# Patient Record
Sex: FEMALE | Race: WHITE | NOT HISPANIC OR LATINO | Employment: PART TIME | URBAN - METROPOLITAN AREA
[De-identification: names, ages, dates, MRNs, and addresses within clinical notes are randomized per-mention and may not be internally consistent; named-entity substitution may affect disease eponyms.]

---

## 2023-08-18 ENCOUNTER — HOSPITAL ENCOUNTER (OUTPATIENT)
Dept: DATA CONVERSION | Facility: HOSPITAL | Age: 53
Discharge: HOME | End: 2023-08-18
Payer: COMMERCIAL

## 2023-08-18 DIAGNOSIS — Z12.31 ENCOUNTER FOR SCREENING MAMMOGRAM FOR MALIGNANT NEOPLASM OF BREAST: ICD-10-CM

## 2023-08-19 ENCOUNTER — HOSPITAL ENCOUNTER (OUTPATIENT)
Dept: DATA CONVERSION | Facility: HOSPITAL | Age: 53
Discharge: HOME | End: 2023-08-19
Payer: COMMERCIAL

## 2023-08-19 DIAGNOSIS — Z13.6 ENCOUNTER FOR SCREENING FOR CARDIOVASCULAR DISORDERS: ICD-10-CM

## 2023-09-12 PROBLEM — M54.50 LOW BACK PAIN: Status: ACTIVE | Noted: 2023-09-12

## 2023-09-12 PROBLEM — E78.00 PURE HYPERCHOLESTEROLEMIA: Status: ACTIVE | Noted: 2023-09-12

## 2023-09-12 PROBLEM — K64.5 THROMBOSED EXTERNAL HEMORRHOIDS: Status: ACTIVE | Noted: 2023-09-12

## 2023-09-12 PROBLEM — R53.1 ASTHENIA: Status: ACTIVE | Noted: 2023-09-12

## 2023-09-12 PROBLEM — R06.00 DYSPNEA: Status: ACTIVE | Noted: 2023-09-12

## 2023-09-12 PROBLEM — M51.379 DEGENERATION OF LUMBOSACRAL INTERVERTEBRAL DISC: Status: ACTIVE | Noted: 2023-09-12

## 2023-09-12 PROBLEM — M51.37 DEGENERATION OF LUMBOSACRAL INTERVERTEBRAL DISC: Status: ACTIVE | Noted: 2023-09-12

## 2023-09-12 PROBLEM — G47.00 INSOMNIA: Status: ACTIVE | Noted: 2023-09-12

## 2023-09-12 PROBLEM — M06.9 RHEUMATOID ARTHRITIS (MULTI): Status: ACTIVE | Noted: 2023-09-12

## 2023-09-12 PROBLEM — G45.9 TRANSIENT ISCHEMIC ATTACK: Status: ACTIVE | Noted: 2023-09-12

## 2023-09-12 PROBLEM — K81.9 CHOLECYSTITIS: Status: ACTIVE | Noted: 2023-09-12

## 2023-09-12 PROBLEM — R33.9 URINARY RETENTION: Status: ACTIVE | Noted: 2023-09-12

## 2023-09-12 PROBLEM — R35.0 FREQUENCY OF URINATION: Status: ACTIVE | Noted: 2023-09-12

## 2023-09-12 PROBLEM — R10.9 FLANK PAIN: Status: ACTIVE | Noted: 2023-09-12

## 2023-09-12 PROBLEM — K59.01 SLOW TRANSIT CONSTIPATION: Status: ACTIVE | Noted: 2023-09-12

## 2023-09-12 PROBLEM — F41.9 ANXIETY: Status: ACTIVE | Noted: 2023-09-12

## 2023-09-12 PROBLEM — G40.909 SEIZURE DISORDER (MULTI): Status: ACTIVE | Noted: 2023-09-12

## 2023-09-12 RX ORDER — LAMOTRIGINE 150 MG/1
TABLET ORAL
COMMUNITY

## 2023-09-12 RX ORDER — CYANOCOBALAMIN (VITAMIN B-12) 3000MCG/ML
DROPS SUBLINGUAL
COMMUNITY

## 2023-09-12 RX ORDER — PRAVASTATIN SODIUM 10 MG/1
TABLET ORAL
COMMUNITY
Start: 2021-09-27 | End: 2023-11-01

## 2023-09-12 RX ORDER — FLUTICASONE PROPIONATE 50 MCG
SPRAY, SUSPENSION (ML) NASAL
COMMUNITY
Start: 2022-07-26

## 2023-09-15 ENCOUNTER — HOSPITAL ENCOUNTER (OUTPATIENT)
Dept: DATA CONVERSION | Facility: HOSPITAL | Age: 53
Discharge: HOME | End: 2023-09-15
Payer: COMMERCIAL

## 2023-09-15 DIAGNOSIS — M79.641 PAIN IN RIGHT HAND: ICD-10-CM

## 2023-11-01 DIAGNOSIS — E78.49 ESSENTIAL FAMILIAL HYPERLIPIDEMIA: Primary | ICD-10-CM

## 2023-11-01 RX ORDER — PRAVASTATIN SODIUM 10 MG/1
10 TABLET ORAL DAILY
Qty: 90 TABLET | Refills: 1 | Status: SHIPPED | OUTPATIENT
Start: 2023-11-01 | End: 2023-12-22 | Stop reason: SDUPTHER

## 2023-12-22 ENCOUNTER — TELEMEDICINE (OUTPATIENT)
Dept: PRIMARY CARE | Facility: CLINIC | Age: 53
End: 2023-12-22
Payer: COMMERCIAL

## 2023-12-22 DIAGNOSIS — E78.49 ESSENTIAL FAMILIAL HYPERLIPIDEMIA: ICD-10-CM

## 2023-12-22 DIAGNOSIS — J11.1 INFLUENZA: Primary | ICD-10-CM

## 2023-12-22 PROCEDURE — 99212 OFFICE O/P EST SF 10 MIN: CPT | Performed by: REGISTERED NURSE

## 2023-12-22 RX ORDER — PRAVASTATIN SODIUM 10 MG/1
10 TABLET ORAL DAILY
Qty: 90 TABLET | Refills: 3 | Status: SHIPPED | OUTPATIENT
Start: 2023-12-22 | End: 2024-06-19

## 2023-12-22 RX ORDER — VALACYCLOVIR HYDROCHLORIDE 500 MG/1
500 TABLET, FILM COATED ORAL DAILY
COMMUNITY

## 2023-12-22 ASSESSMENT — ENCOUNTER SYMPTOMS
DEPRESSION: 0
OCCASIONAL FEELINGS OF UNSTEADINESS: 0
LOSS OF SENSATION IN FEET: 0

## 2023-12-22 NOTE — PROGRESS NOTES
Subjective   Patient ID: Margie Romero is a 53 y.o. female who presents for Generalized Body Aches (Pt not feeling well. No specifics).    HPI     Review of Systems    Objective   There were no vitals taken for this visit.    Physical Exam    Assessment/Plan

## 2024-01-09 ENCOUNTER — OFFICE VISIT (OUTPATIENT)
Dept: PRIMARY CARE | Facility: CLINIC | Age: 54
End: 2024-01-09
Payer: COMMERCIAL

## 2024-01-09 VITALS
SYSTOLIC BLOOD PRESSURE: 126 MMHG | OXYGEN SATURATION: 98 % | HEART RATE: 79 BPM | WEIGHT: 208.8 LBS | BODY MASS INDEX: 35.65 KG/M2 | RESPIRATION RATE: 18 BRPM | TEMPERATURE: 98.2 F | HEIGHT: 64 IN | DIASTOLIC BLOOD PRESSURE: 82 MMHG

## 2024-01-09 DIAGNOSIS — G40.909 SEIZURE DISORDER (MULTI): ICD-10-CM

## 2024-01-09 DIAGNOSIS — G40.209 COMPLEX PARTIAL SEIZURES EVOLVING TO GENERALIZED TONIC-CLONIC SEIZURES (MULTI): ICD-10-CM

## 2024-01-09 DIAGNOSIS — M51.37 DEGENERATION OF LUMBOSACRAL INTERVERTEBRAL DISC: ICD-10-CM

## 2024-01-09 DIAGNOSIS — Z00.00 ROUTINE GENERAL MEDICAL EXAMINATION AT A HEALTH CARE FACILITY: Primary | ICD-10-CM

## 2024-01-09 DIAGNOSIS — R35.0 FREQUENCY OF URINATION: ICD-10-CM

## 2024-01-09 DIAGNOSIS — E66.9 OBESITY, CLASS II, BMI 35-39.9: ICD-10-CM

## 2024-01-09 DIAGNOSIS — M54.50 LOW BACK PAIN, UNSPECIFIED BACK PAIN LATERALITY, UNSPECIFIED CHRONICITY, UNSPECIFIED WHETHER SCIATICA PRESENT: ICD-10-CM

## 2024-01-09 DIAGNOSIS — G47.00 INSOMNIA, UNSPECIFIED TYPE: ICD-10-CM

## 2024-01-09 DIAGNOSIS — F41.9 ANXIETY: ICD-10-CM

## 2024-01-09 DIAGNOSIS — M06.9 RHEUMATOID ARTHRITIS, INVOLVING UNSPECIFIED SITE, UNSPECIFIED WHETHER RHEUMATOID FACTOR PRESENT (MULTI): ICD-10-CM

## 2024-01-09 DIAGNOSIS — M25.50 ARTHRALGIA, UNSPECIFIED JOINT: ICD-10-CM

## 2024-01-09 PROBLEM — K81.9 CHOLECYSTITIS: Status: RESOLVED | Noted: 2023-09-12 | Resolved: 2024-01-09

## 2024-01-09 PROBLEM — Z86.73 HX OF TRANSIENT ISCHEMIC ATTACK (TIA): Status: ACTIVE | Noted: 2024-01-09

## 2024-01-09 PROBLEM — G45.9 TRANSIENT ISCHEMIC ATTACK: Status: RESOLVED | Noted: 2023-09-12 | Resolved: 2024-01-09

## 2024-01-09 PROBLEM — R10.9 FLANK PAIN: Status: RESOLVED | Noted: 2023-09-12 | Resolved: 2024-01-09

## 2024-01-09 PROBLEM — E78.00 PURE HYPERCHOLESTEROLEMIA: Status: RESOLVED | Noted: 2023-09-12 | Resolved: 2024-01-09

## 2024-01-09 LAB
POC APPEARANCE, URINE: CLEAR
POC BILIRUBIN, URINE: NEGATIVE
POC BLOOD, URINE: NEGATIVE
POC COLOR, URINE: YELLOW
POC GLUCOSE, URINE: NEGATIVE MG/DL
POC KETONES, URINE: NEGATIVE MG/DL
POC LEUKOCYTES, URINE: NEGATIVE
POC NITRITE,URINE: NEGATIVE
POC PH, URINE: 6 PH
POC PROTEIN, URINE: NEGATIVE MG/DL
POC SPECIFIC GRAVITY, URINE: 1.01
POC UROBILINOGEN, URINE: 0.2 EU/DL

## 2024-01-09 PROCEDURE — 36415 COLL VENOUS BLD VENIPUNCTURE: CPT | Performed by: REGISTERED NURSE

## 2024-01-09 PROCEDURE — 84443 ASSAY THYROID STIM HORMONE: CPT | Performed by: REGISTERED NURSE

## 2024-01-09 PROCEDURE — 86140 C-REACTIVE PROTEIN: CPT | Performed by: REGISTERED NURSE

## 2024-01-09 PROCEDURE — 85025 COMPLETE CBC W/AUTO DIFF WBC: CPT | Mod: WESLAB | Performed by: REGISTERED NURSE

## 2024-01-09 PROCEDURE — 81002 URINALYSIS NONAUTO W/O SCOPE: CPT | Performed by: REGISTERED NURSE

## 2024-01-09 PROCEDURE — 85652 RBC SED RATE AUTOMATED: CPT | Performed by: REGISTERED NURSE

## 2024-01-09 PROCEDURE — 82306 VITAMIN D 25 HYDROXY: CPT | Performed by: REGISTERED NURSE

## 2024-01-09 PROCEDURE — 82607 VITAMIN B-12: CPT | Performed by: REGISTERED NURSE

## 2024-01-09 PROCEDURE — 99396 PREV VISIT EST AGE 40-64: CPT | Performed by: REGISTERED NURSE

## 2024-01-09 PROCEDURE — 1036F TOBACCO NON-USER: CPT | Performed by: REGISTERED NURSE

## 2024-01-09 PROCEDURE — 80053 COMPREHEN METABOLIC PANEL: CPT | Performed by: REGISTERED NURSE

## 2024-01-09 RX ORDER — LEFLUNOMIDE 20 MG/1
20 TABLET ORAL DAILY
COMMUNITY

## 2024-01-09 RX ORDER — HYDROGEN PEROXIDE 3 %
20 SOLUTION, NON-ORAL MISCELLANEOUS AS NEEDED
COMMUNITY

## 2024-01-09 RX ORDER — ONDANSETRON 4 MG/1
4 TABLET, FILM COATED ORAL EVERY 8 HOURS PRN
COMMUNITY

## 2024-01-09 RX ORDER — NORETHINDRONE 5 MG/1
5 TABLET ORAL EVERY 24 HOURS
COMMUNITY
Start: 2021-09-01

## 2024-01-09 RX ORDER — AMITRIPTYLINE HYDROCHLORIDE 10 MG/1
10 TABLET, FILM COATED ORAL NIGHTLY
COMMUNITY
Start: 2023-12-28

## 2024-01-09 RX ORDER — IBUPROFEN AND FAMOTIDINE 26.6; 8 MG/1; MG/1
TABLET ORAL
COMMUNITY

## 2024-01-09 RX ORDER — DOCUSATE SODIUM 100 MG/1
100 CAPSULE, LIQUID FILLED ORAL 2 TIMES DAILY PRN
COMMUNITY

## 2024-01-09 RX ORDER — NITROFURANTOIN 25; 75 MG/1; MG/1
100 CAPSULE ORAL EVERY 12 HOURS
COMMUNITY
Start: 2021-09-01

## 2024-01-09 ASSESSMENT — PAIN SCALES - GENERAL: PAINLEVEL: 0-NO PAIN

## 2024-01-09 ASSESSMENT — COLUMBIA-SUICIDE SEVERITY RATING SCALE - C-SSRS
1. IN THE PAST MONTH, HAVE YOU WISHED YOU WERE DEAD OR WISHED YOU COULD GO TO SLEEP AND NOT WAKE UP?: NO
2. HAVE YOU ACTUALLY HAD ANY THOUGHTS OF KILLING YOURSELF?: NO
6. HAVE YOU EVER DONE ANYTHING, STARTED TO DO ANYTHING, OR PREPARED TO DO ANYTHING TO END YOUR LIFE?: NO

## 2024-01-09 ASSESSMENT — ENCOUNTER SYMPTOMS
LOSS OF SENSATION IN FEET: 0
DEPRESSION: 0
OCCASIONAL FEELINGS OF UNSTEADINESS: 0

## 2024-01-09 ASSESSMENT — PATIENT HEALTH QUESTIONNAIRE - PHQ9
SUM OF ALL RESPONSES TO PHQ9 QUESTIONS 1 AND 2: 0
2. FEELING DOWN, DEPRESSED OR HOPELESS: NOT AT ALL
1. LITTLE INTEREST OR PLEASURE IN DOING THINGS: NOT AT ALL

## 2024-01-09 NOTE — PROGRESS NOTES
"Subjective   Patient ID: Margie Romero is a 53 y.o. female who presents for Annual Exam and UTI.    Pt here for PE and BW       Review of Systems   All other systems reviewed and are negative.      Objective   /82   Pulse 79   Temp 36.8 °C (98.2 °F)   Resp 18   Ht 1.626 m (5' 4\")   Wt 94.7 kg (208 lb 12.8 oz)   SpO2 98%   BMI 35.84 kg/m²     Physical Exam  Vitals reviewed.   Constitutional:       General: She is not in acute distress.     Appearance: Normal appearance.   HENT:      Right Ear: Tympanic membrane, ear canal and external ear normal. There is no impacted cerumen.      Left Ear: Tympanic membrane, ear canal and external ear normal. There is no impacted cerumen.      Nose: Nose normal. No congestion or rhinorrhea.      Mouth/Throat:      Mouth: Mucous membranes are moist.      Pharynx: No oropharyngeal exudate or posterior oropharyngeal erythema.   Eyes:      General:         Right eye: No discharge.         Left eye: No discharge.      Extraocular Movements: Extraocular movements intact.      Conjunctiva/sclera: Conjunctivae normal.      Pupils: Pupils are equal, round, and reactive to light.   Cardiovascular:      Rate and Rhythm: Normal rate and regular rhythm.      Pulses: Normal pulses.      Heart sounds: No murmur heard.  Pulmonary:      Effort: Pulmonary effort is normal. No respiratory distress.      Breath sounds: Normal breath sounds. No wheezing or rhonchi.   Chest:      Chest wall: No tenderness.   Abdominal:      General: Abdomen is flat. Bowel sounds are normal. There is no distension.      Palpations: Abdomen is soft.      Tenderness: There is no abdominal tenderness. There is no right CVA tenderness, guarding or rebound.   Musculoskeletal:         General: Normal range of motion.      Cervical back: Normal range of motion and neck supple.      Right lower leg: No edema.      Left lower leg: No edema.   Skin:     General: Skin is warm and dry.      Capillary Refill: Capillary " refill takes 2 to 3 seconds.   Neurological:      General: No focal deficit present.      Mental Status: She is alert and oriented to person, place, and time.   Psychiatric:         Mood and Affect: Mood normal.         Behavior: Behavior normal.         Thought Content: Thought content normal.       Assessment/Plan   Problem List Items Addressed This Visit             ICD-10-CM    Anxiety F41.9    Degeneration of lumbosacral intervertebral disc M51.37    Frequency of urination R35.0    Relevant Orders    POCT UA (nonautomated) manually resulted (Completed)    Insomnia G47.00    Low back pain M54.50    Rheumatoid arthritis (CMS/HCC) M06.9    Seizure disorder (CMS/HCC) G40.909    Complex partial seizures evolving to generalized tonic-clonic seizures (CMS/HCC) G40.209    Hx of transient ischemic attack (TIA) Z86.73     Other Visit Diagnoses         Codes    Routine general medical examination at a health care facility    -  Primary Z00.00    Obesity, Class II, BMI 35-39.9     E66.9

## 2024-01-10 LAB
25(OH)D3 SERPL-MCNC: 45 NG/ML (ref 31–100)
ALBUMIN SERPL-MCNC: 4.4 G/DL (ref 3.5–5)
ALP BLD-CCNC: 93 U/L (ref 35–125)
ALT SERPL-CCNC: 18 U/L (ref 5–40)
ANION GAP SERPL CALC-SCNC: 17 MMOL/L
AST SERPL-CCNC: 31 U/L (ref 5–40)
BASOPHILS # BLD AUTO: 0.05 X10*3/UL (ref 0–0.1)
BASOPHILS NFR BLD AUTO: 0.6 %
BILIRUB SERPL-MCNC: 0.3 MG/DL (ref 0.1–1.2)
BUN SERPL-MCNC: 13 MG/DL (ref 8–25)
CALCIUM SERPL-MCNC: 9.4 MG/DL (ref 8.5–10.4)
CHLORIDE SERPL-SCNC: 101 MMOL/L (ref 97–107)
CO2 SERPL-SCNC: 22 MMOL/L (ref 24–31)
CREAT SERPL-MCNC: 0.8 MG/DL (ref 0.4–1.6)
CRP SERPL-MCNC: <0.3 MG/DL (ref 0–2)
EGFRCR SERPLBLD CKD-EPI 2021: 89 ML/MIN/1.73M*2
EOSINOPHIL # BLD AUTO: 0.28 X10*3/UL (ref 0–0.7)
EOSINOPHIL NFR BLD AUTO: 3.5 %
ERYTHROCYTE [DISTWIDTH] IN BLOOD BY AUTOMATED COUNT: 12.5 % (ref 11.5–14.5)
ERYTHROCYTE [SEDIMENTATION RATE] IN BLOOD BY WESTERGREN METHOD: 14 MM/H (ref 0–30)
GLUCOSE SERPL-MCNC: 106 MG/DL (ref 65–99)
HCT VFR BLD AUTO: 43.7 % (ref 36–46)
HGB BLD-MCNC: 14.4 G/DL (ref 12–16)
IMM GRANULOCYTES # BLD AUTO: 0.02 X10*3/UL (ref 0–0.7)
IMM GRANULOCYTES NFR BLD AUTO: 0.2 % (ref 0–0.9)
LYMPHOCYTES # BLD AUTO: 2.74 X10*3/UL (ref 1.2–4.8)
LYMPHOCYTES NFR BLD AUTO: 34.2 %
MCH RBC QN AUTO: 30.3 PG (ref 26–34)
MCHC RBC AUTO-ENTMCNC: 33 G/DL (ref 32–36)
MCV RBC AUTO: 92 FL (ref 80–100)
MONOCYTES # BLD AUTO: 0.62 X10*3/UL (ref 0.1–1)
MONOCYTES NFR BLD AUTO: 7.7 %
NEUTROPHILS # BLD AUTO: 4.31 X10*3/UL (ref 1.2–7.7)
NEUTROPHILS NFR BLD AUTO: 53.8 %
NRBC BLD-RTO: 0 /100 WBCS (ref 0–0)
PLATELET # BLD AUTO: 260 X10*3/UL (ref 150–450)
POTASSIUM SERPL-SCNC: 4.4 MMOL/L (ref 3.4–5.1)
PROT SERPL-MCNC: 6.7 G/DL (ref 5.9–7.9)
RBC # BLD AUTO: 4.75 X10*6/UL (ref 4–5.2)
SODIUM SERPL-SCNC: 140 MMOL/L (ref 133–145)
TSH SERPL DL<=0.05 MIU/L-ACNC: 2.35 MIU/L (ref 0.27–4.2)
VIT B12 SERPL-MCNC: 579 PG/ML (ref 211–946)
WBC # BLD AUTO: 8 X10*3/UL (ref 4.4–11.3)

## 2024-11-22 ENCOUNTER — OFFICE VISIT (OUTPATIENT)
Dept: PRIMARY CARE | Facility: CLINIC | Age: 54
End: 2024-11-22
Payer: COMMERCIAL

## 2024-11-22 DIAGNOSIS — M54.50 LOW BACK PAIN, UNSPECIFIED BACK PAIN LATERALITY, UNSPECIFIED CHRONICITY, UNSPECIFIED WHETHER SCIATICA PRESENT: Primary | ICD-10-CM

## 2024-11-22 PROCEDURE — 99213 OFFICE O/P EST LOW 20 MIN: CPT | Performed by: NURSE PRACTITIONER

## 2024-11-22 RX ORDER — METHYLPREDNISOLONE 4 MG/1
TABLET ORAL
Qty: 21 TABLET | Refills: 0 | Status: SHIPPED | OUTPATIENT
Start: 2024-11-22

## 2024-11-22 RX ORDER — CYCLOBENZAPRINE HCL 10 MG
10 TABLET ORAL NIGHTLY PRN
Qty: 30 TABLET | Refills: 0 | Status: SHIPPED | OUTPATIENT
Start: 2024-11-22 | End: 2025-01-21

## 2024-11-22 ASSESSMENT — ENCOUNTER SYMPTOMS
CONSTITUTIONAL NEGATIVE: 1
CARDIOVASCULAR NEGATIVE: 1
GASTROINTESTINAL NEGATIVE: 1
NEUROLOGICAL NEGATIVE: 1
DEPRESSION: 0
RESPIRATORY NEGATIVE: 1
MUSCULOSKELETAL NEGATIVE: 1
OCCASIONAL FEELINGS OF UNSTEADINESS: 0
LOSS OF SENSATION IN FEET: 0

## 2024-11-22 ASSESSMENT — PATIENT HEALTH QUESTIONNAIRE - PHQ9
2. FEELING DOWN, DEPRESSED OR HOPELESS: NOT AT ALL
1. LITTLE INTEREST OR PLEASURE IN DOING THINGS: NOT AT ALL
SUM OF ALL RESPONSES TO PHQ9 QUESTIONS 1 AND 2: 0

## 2024-11-22 ASSESSMENT — PAIN SCALES - GENERAL: PAINLEVEL_OUTOF10: 6

## 2024-11-22 NOTE — PROGRESS NOTES
Subjective   Patient ID: Margie Romero is a 54 y.o. female who presents for Weight Loss, Steroid, and RA.  Was seen by rheumatology and stopped Orencia issues with Dr Soto was taking home injections but not the same as Orencia will be seeing Dr Barksdale office March  Discuss tizeipitide injection or pills from online pharmacy   Issues with   HPI     Review of Systems   Constitutional: Negative.    HENT: Negative.     Respiratory: Negative.     Cardiovascular: Negative.    Gastrointestinal: Negative.    Genitourinary: Negative.    Musculoskeletal: Negative.    Neurological: Negative.        Objective   There were no vitals taken for this visit.    Physical Exam  Constitutional:       General: She is not in acute distress.     Appearance: Normal appearance.   Cardiovascular:      Rate and Rhythm: Normal rate and regular rhythm.      Heart sounds: No murmur heard.  Pulmonary:      Effort: Pulmonary effort is normal.      Breath sounds: Normal breath sounds. No wheezing.   Musculoskeletal:      Cervical back: No edema, erythema or tenderness. Normal range of motion.      Thoracic back: No spasms, tenderness or bony tenderness. Normal range of motion.      Lumbar back: No spasms, tenderness or bony tenderness. Normal range of motion. Negative right straight leg raise test and negative left straight leg raise test.      Comments:      Skin:     General: Skin is warm and dry.   Neurological:      Mental Status: She is alert.      Cranial Nerves: Cranial nerves 2-12 are intact.      Sensory: Sensation is intact.      Motor: No weakness, tremor, atrophy or abnormal muscle tone.      Gait: Gait is intact. Gait normal.      Deep Tendon Reflexes:      Reflex Scores:       Brachioradialis reflexes are 2+ on the right side and 2+ on the left side.       Patellar reflexes are 2+ on the right side and 2+ on the left side.       Achilles reflexes are 2+ on the right side and 2+ on the left side.        Assessment/Plan   Problem List  Items Addressed This Visit             ICD-10-CM    Low back pain - Primary M54.50    Relevant Medications    methylPREDNISolone (Medrol Dospak) 4 mg tablets    cyclobenzaprine (Flexeril) 10 mg tablet

## 2025-03-03 ENCOUNTER — OFFICE VISIT (OUTPATIENT)
Dept: RHEUMATOLOGY | Facility: CLINIC | Age: 55
End: 2025-03-03
Payer: COMMERCIAL

## 2025-03-03 ENCOUNTER — HOSPITAL ENCOUNTER (OUTPATIENT)
Dept: RADIOLOGY | Facility: HOSPITAL | Age: 55
Discharge: HOME | End: 2025-03-03
Payer: COMMERCIAL

## 2025-03-03 VITALS
OXYGEN SATURATION: 97 % | WEIGHT: 218.2 LBS | SYSTOLIC BLOOD PRESSURE: 126 MMHG | BODY MASS INDEX: 37.45 KG/M2 | HEART RATE: 62 BPM | DIASTOLIC BLOOD PRESSURE: 82 MMHG

## 2025-03-03 DIAGNOSIS — Z87.39 HISTORY OF RHEUMATOID ARTHRITIS: ICD-10-CM

## 2025-03-03 DIAGNOSIS — M25.50 POLYARTHRALGIA: Primary | ICD-10-CM

## 2025-03-03 DIAGNOSIS — M25.50 POLYARTHRALGIA: ICD-10-CM

## 2025-03-03 PROCEDURE — 73630 X-RAY EXAM OF FOOT: CPT | Mod: 50

## 2025-03-03 PROCEDURE — 72202 X-RAY EXAM SI JOINTS 3/> VWS: CPT

## 2025-03-03 PROCEDURE — 99215 OFFICE O/P EST HI 40 MIN: CPT | Performed by: INTERNAL MEDICINE

## 2025-03-03 PROCEDURE — 73080 X-RAY EXAM OF ELBOW: CPT | Mod: 50

## 2025-03-03 PROCEDURE — 1036F TOBACCO NON-USER: CPT | Performed by: INTERNAL MEDICINE

## 2025-03-03 PROCEDURE — 73521 X-RAY EXAM HIPS BI 2 VIEWS: CPT

## 2025-03-03 PROCEDURE — 73130 X-RAY EXAM OF HAND: CPT | Mod: 50

## 2025-03-03 PROCEDURE — 99205 OFFICE O/P NEW HI 60 MIN: CPT | Performed by: INTERNAL MEDICINE

## 2025-03-03 ASSESSMENT — PATIENT HEALTH QUESTIONNAIRE - PHQ9
2. FEELING DOWN, DEPRESSED OR HOPELESS: NOT AT ALL
SUM OF ALL RESPONSES TO PHQ9 QUESTIONS 1 AND 2: 0
1. LITTLE INTEREST OR PLEASURE IN DOING THINGS: NOT AT ALL

## 2025-03-03 ASSESSMENT — RHEUMATOLOGY NEW PATIENT QUESTIONNAIRE
DEPRESSION: N
UNUSUAL BLEEDING: N
ABNORMAL URINE: N
MORNING STIFFNESS IN LOWER BACK: Y
EXCESSIVE HAIR LOSS (MORE THAN YOUR NORM): N
INCREASED SUSCEPTIBILITY TO INFECTION: N
MORNING STIFFNESS: Y
RASH: N
NIGHT SWEATS: N
CHEST PAIN: N
HOW WOULD YOU DESCRIBE YOUR STIFFNESS ON AVERAGE: MILD
UNUSUAL FATIGUE: N
UNUSUALLY RAPID OR SLOWED HEART RATE: N
NUMBNESS OR TINGLING IN HANDS OR FEET: N
NAUSEA: N
ANEMIA: N
LOSS OF CONSCIOUSNESS: N
HEARTBURN OR REFLUX: N
SKIN REDNESS: N
SWOLLEN OR TENDER GLANDS: N
PAIN OR BURNING ON URINATION: N
SUN SENSITIVE (SUN ALLERGY): N
BEHAVIORAL CHANGES: N
DIFFICULTY SWALLOWING: N
SKIN TIGHTNESS: N
RASH OR ULCERS: N
MUSCLE WEAKNESS: Y
DOUBLE OR BLURRED VISION: N
SORES IN MOUTH OR NOSE: N
VOMITING OF BLOOD OR COFFEE GROUND CONSISTENCY MATERIAL: N
DRYNESS OF MOUTH: N
UNEXPLAINED WEIGHT CHANGE: N
EYE REDNESS: N
VAGINAL DRYNESS: N
DIFFICULTY BREATHING LYING DOWN: N
BLACK STOOLS: N
BLOOD IN STOOLS: N
SEIZURES: Y
FEVER: N
HEADACHES: N
FAINTING: N
UNEXPLAINED HEARING LOSS: N
MEMORY LOSS: Y
DIFFICULTY FALLING ASLEEP: N
EASILY LOSING TEMPER: N
JOINT PAIN: Y
EYE PAIN: N
JOINT SWELLING: Y
SWOLLEN LEGS OR FEET: N
PERSISTENT DIARRHEA: N
EYE DRYNESS: N
SHORTNESS OF BREATH: N
HOARSE VOICE: N
NODULES/BUMPS: N
COUGH: N
LOSS OF VISION: N
EASY BRUISING: Y
ANXIETY: Y
DIFFICULTY STAYING ASLEEP: Y
COLOR CHANGES OF HANDS OR FEET IN THE COLD: N
JAUNDICE: N
AGITATION: N
STOMACH PAIN: N

## 2025-03-03 ASSESSMENT — PAIN SCALES - GENERAL: PAINLEVEL_OUTOF10: 4

## 2025-03-03 ASSESSMENT — ENCOUNTER SYMPTOMS
OCCASIONAL FEELINGS OF UNSTEADINESS: 1
LOSS OF SENSATION IN FEET: 0
DEPRESSION: 0

## 2025-03-03 NOTE — PROGRESS NOTES
Subjective   Patient ID: [id number]   Margie Romero is a 54 y.o. female who presents for New Patient Visit.    HPI  Recall:  She was seeing Dr. Elam and then Dr. Soto and is here to re-establish care.   She was initially diagnosed by Dr. Agudelo in 2002, she was having plantar fascitis, b/l knee pain, worse with steps and activity and sometimes get better with rest, b/l hip pain especially with lying on her sides. She was diagnosed with RA. She was initially started on methotrexate without much improvement. She was then switched to orencia.     She was on orencia monthly for RA, then became an issue with buy and bill and has been off it.  At some point she was also on combo LEF + orencia, but she developed nausea.   Her last orencia was January 2024 and then she was switched to the orencia subcutaneous injections but didn't feel as well on the subcutaneous injections but developed injection site reactions too and would feel that her skin was so tight.     Patient reports that she felt no difference on methotrexate, leflunomide, orencia.     First visit 3/3:  She feels stiff in her hands worse in AM and improves as day goes by. No pain or swelling in MCPs, PIPs.   She reports aching in the wrists after long day of work, she is a  at [employer].  She reports hips are achy L>R, lateral, she was referred to PT at some point for bursitis and it helped.   Had issues with L elbow epicondylitis  She did PT for shoulder, shoulders feel ok for the most part.   Knees feel ok, but they crack and crunch.   Ankles have been swollen the last couple of weeks, worse towards end of the day after work.   R foot longstanding plantar fascitis and is limping first thing in AM.   Has chronic low back pain, difficult to get up in AM, started after hysterectomy but has improved and has had 2 lumbar BERNADETTE by pain management and they help. Last one was in July and the effect is lasting.     She reports these symptoms have been pretty much the  same before and after the orencia.   Reports medrol dose pack helps her symptoms, she doesn't feel much pain and has the ability to ambulate more, and helps the stiffnesss  No enthesitis    ROS  Constitutional: Denies fever, chills, weight loss, night sweats or headaches. No fatigue, not refreshed in AM.   Eyes: Denies dry eyes, blurry vision, redness or pain or photophobia  ENT: Denies dry mouth, dental loss, loss of taste, nasal or oral ulcers, difficulty swallowing, recurrent sinus infections   Cardiovascular: Denies chest pain  Respiratory: Denies shortness of breath, cough, asthma, or recurrent respiratory infections  Gastrointestinal: Denies nausea, vomiting, heartburn, abdominal pain, constipation, diarrhea, melena or hematochezia  Genitourinary: No recurrent urinary infections or STDs, no genital or anal ulcers.  Integumentary: Denies photosensitivity, rash or lesions, Raynaud's phenomenon, psoriatic lesions  Neurological: Denies any numbness or tingling  Hematologic/Lymphatic: Denies history of clots (arterial or venous). 1 1st trimester miscarriage before pregnancies.   MSK: as above     PMH/PSH: Seizures on lamictal, GERD, on nexium as needed, hysterectomy   Social: Has 3 children   FHx: No family history of autoimmune diseases       Patient Active Problem List   Diagnosis    Anxiety    Asthenia    Slow transit constipation    Degeneration of lumbosacral intervertebral disc    Dyspnea    Frequency of urination    Insomnia    Low back pain    Rheumatoid arthritis    Seizure disorder (Multi)    Thrombosed external hemorrhoids    Urinary retention    Complex partial seizures evolving to generalized tonic-clonic seizures (Multi)    Hx of transient ischemic attack (TIA)        Past Medical History:   Diagnosis Date    Arthritis     Cholecystitis 09/12/2023    Flank pain 09/12/2023    Pure hypercholesterolemia 09/12/2023    Seizures (Multi)     Transient ischemic attack 09/12/2023        Past Surgical History:    Procedure Laterality Date    MR HEAD ANGIO WO IV CONTRAST  4/16/2015    MR HEAD ANGIO WO IV CONTRAST [facility]        Social History     Socioeconomic History    Marital status:      Spouse name: Not on file    Number of children: Not on file    Years of education: Not on file    Highest education level: Not on file   Occupational History    Not on file   Tobacco Use    Smoking status: Never     Passive exposure: Never    Smokeless tobacco: Never   Vaping Use    Vaping status: Never Used   Substance and Sexual Activity    Alcohol use: Not Currently    Drug use: Never    Sexual activity: Not on file   Other Topics Concern    Not on file   Social History Narrative    Not on file     Social Drivers of Health     Financial Resource Strain: Not on file   Food Insecurity: Not on file   Transportation Needs: Not on file   Physical Activity: Not on file   Stress: Not on file   Social Connections: Not on file   Intimate Partner Violence: Not on file   Housing Stability: Not on file        No Known Allergies       Current Outpatient Medications:     amitriptyline (Elavil) 10 mg tablet, Take 1 tablet (10 mg) by mouth once daily at bedtime., Disp: , Rfl:     cholecalciferol, vitamin D3, (VITAMIN D3 ORAL), 2000 UNIT  - 1 capsule Orally Once a day, Disp: , Rfl:     cyanocobalamin, vitamin B-12, 3,000 mcg/mL drops, as directed Sublingual, Disp: , Rfl:     docusate sodium (Colace) 100 mg capsule, Take 1 capsule (100 mg) by mouth 2 times a day as needed., Disp: , Rfl:     esomeprazole (NexIUM) 20 mg DR capsule, Take 1 capsule (20 mg) by mouth if needed., Disp: , Rfl:     lamoTRIgine (LaMICtal) 150 mg tablet, 1 tablet Orally Twice a day, Disp: , Rfl:     methylPREDNISolone (Medrol Dospak) 4 mg tablets, Take as directed on package., Disp: 21 tablet, Rfl: 0    ondansetron (Zofran) 4 mg tablet, Take 1 tablet (4 mg) by mouth every 8 hours if needed., Disp: , Rfl:     valACYclovir (Valtrex) 500 mg tablet, Take 1 tablet  (500 mg) by mouth once daily., Disp: , Rfl:     cyclobenzaprine (Flexeril) 10 mg tablet, Take 1 tablet (10 mg) by mouth as needed at bedtime for muscle spasms., Disp: 30 tablet, Rfl: 0    fluticasone (Flonase) 50 mcg/actuation nasal spray, 1 spray in each nostril Nasally Once a day for 30 day(s), Disp: , Rfl:     leflunomide (Arava) 20 mg tablet, Take 1 tablet (20 mg) by mouth once daily., Disp: , Rfl:     norethindrone (Aygestin) 5 mg tablet, Take 1 tablet (5 mg) by mouth once every 24 hours., Disp: , Rfl:     pravastatin (Pravachol) 10 mg tablet, Take 1 tablet (10 mg) by mouth once daily., Disp: 90 tablet, Rfl: 3       Objective     Visit Vitals  /82 (BP Location: Right arm, Patient Position: Sitting)   Pulse 62        Physical Exam  General: AAOx3, Cooperative  Head: normocephalic, atraumatic  Eyes: EOMI, conjunctiva clear, sclera white, anicteric  Throat/Mouth: No oral deformities, no cheek swelling, mucosa appear dry, no oral ulcers noted   Neck/Lymph: FROM, trachea midline  Neuro: CN II-XII grossly intact, no focal deficit  Skin: No rashes, ulcers or photosensitive areas  MSK: Upper Extremities:  Hand/Fingers: No erythema, swelling, tenderness or warmth at DIP, PIP, or MCP joints, FROM grossly. Good hand . No nodules. No deformities   Wrists: No erythema, swelling, warmth or tenderness at wrist, FROM grossly  Elbows: lateral epicondyle tenderness to palpation bilaterally, not exacerbated by resisted wrist flexion or extension.   Shoulders: No swelling, erythema, tenderness or warmth at shoulders. FROM  Lower Extremities:   Hips: No obvious deformities. + ROSE MARY on the L.   Knees: No tenderness, deformities, swelling, rashes, or warmth, normal ROM grossly. + crepitus  Ankles: + Soft tissue swelling, no deformities, tenderness,  erythema, ulceration, or warmth at the ankle. + achilles enthesitis on the R, + plantar fasciitis on the L.   Feet: Negative MTP squeeze. Normal ROM grossly.   Spine: No spinal  "tenderness to palpation.      Lab Results   Component Value Date    WBC 8.0 01/09/2024    HGB 14.4 01/09/2024    HCT 43.7 01/09/2024    MCV 92 01/09/2024     01/09/2024        Chemistry    Lab Results   Component Value Date/Time     01/09/2024 0839    K 4.4 01/09/2024 0839     01/09/2024 0839    CO2 22 (L) 01/09/2024 0839    BUN 13 01/09/2024 0839    CREATININE 0.80 01/09/2024 0839    Lab Results   Component Value Date/Time    CALCIUM 9.4 01/09/2024 0839    ALKPHOS 93 01/09/2024 0839    AST 31 01/09/2024 0839    ALT 18 01/09/2024 0839    BILITOT 0.3 01/09/2024 0839           Lab Results   Component Value Date    CRP <0.30 01/09/2024      Lab Results   Component Value Date    SEDRATE 14 01/09/2024      No results found for: \"CKTOTAL\"  Lab Results   Component Value Date    NEUTROABS 4.31 01/09/2024      Lab Results   Component Value Date    FERRITIN 72 01/11/2022      No results found for: \"HEPATOT\", \"HEPAIGM\", \"HEPBCIGM\", \"HEPBCAB\", \"HBEAG\", \"HEPCAB\"   Lab Results   Component Value Date    ALT 18 01/09/2024    AST 31 01/09/2024    ALKPHOS 93 01/09/2024    BILITOT 0.3 01/09/2024      No results found for: \"PPD\"   No results found for: \"URICACID\"   Lab Results   Component Value Date    CALCIUM 9.4 01/09/2024    PHOS 3.6 11/03/2020      No results found for: \"SPEP\", \"UPEP\"   No results found for: \"ALBUR\", \"[id number]\"     XR hand right 3+ views  PROCEDURE:         HAND RT MIN 3 VIEW - NXR  0055  REASON FOR EXAM: pain x 1 month    RESULT: MRN: [id number]  Patient Name: THUY GOLDSTEIN    STUDY:  HAND RT MIN 3 VIEW 9/15/2023 11:06 am    INDICATION:  pain x 1 month right hand pain for 1 month intermittently. No known trauma.    COMPARISON:  None available.    ACCESSION NUMBER(S):  [id number]    ORDERING CLINICIAN:  ESTHER ALEGRIA    TECHNIQUE:  Three views of the right hand were performed.    FINDINGS:  No acute fractures or dislocation are seen. Joint spaces overall appear  maintained.    IMPRESSION:  No " sign of acute right hand fracture or dislocation.    MACRO:  None.  Dictation workstation:   [id number]    Original Interpreting Physician:   NISHA SANTIAGO MD  Original Transcribed by/Date: MMODAL Sep 15 2023 10:59A  Original Electronically Signed by/Date: NISHA SANTIAGO MD Sep 15 2023 11:08A    Addendum Interpreting Physician:  Addendum Transcribed by/Date: NO ADDENDUM  Addendum Electronically Signed by/Date:     === 09/15/23 ===    XR HAND 3+ VIEWS RIGHT    - Impression -  No sign of acute right hand fracture or dislocation.    MACRO:  None.  Dictation workstation:   [id number]    Original Interpreting Physician:   NISHA SANTIAGO MD  Original Transcribed by/Date: MMODAL Sep 15 2023 10:59A  Original Electronically Signed by/Date: NISHA SANTIAGO MD Sep 15 2023 11:08A    Addendum Interpreting Physician:  Addendum Transcribed by/Date: NO ADDENDUM  Addendum Electronically Signed by/Date:   === 06/16/22 ===    MR LUMBAR SPINE WO CONTRAST    - Impression -  1. Multilevel degenerative discogenic changes within the lumbar spine severe  loss disc space height and endplate reactive changes L5/S1.  2. Asymmetric right paracentral disc osteophyte complex L5/S1 with mild  asymmetric narrowing right lateral recess abutting the emerging right S2  nerve root. No displacement.    This report has been produced using speech recognition.    Original Interpreting Physician:   MARTINEZ ASHTON MD  Original [facility]/Date: [facility]   Jun 16 2022  1:01P  Original Electronically Signed by/Date: MARTINEZ ASHTON MD Jun 16 2022  1:01P    Addendum Interpreting Physician:  Addendum Transcribed by/Date: NO ADDENDUM  Addendum Electronically Signed by/Date:        Assessment/Plan    A 54 year old F here with previous diagnosis of RA.     Has evidence of enthesitis of R plantar fascia, achilles, b/l lateral epicondyles, L GTB. Has swelling of b/l ankles but no ttp, erythema, or warmth.     No synovitis of hands or feet.      Labs from  1/9/2024 reviewed:  CBC  and CMPnormal  ESR and CRP normal     - Labs today  - XR   - Counterforce braces for b/l lateral epicondylitis  - After imaging, might need CSI for plantar fasciitis     RTC in 2 weeks for discussion of results       Zuleika Skelton MD  Division of Rheumatology   [facility]

## 2025-03-08 LAB
ANA PAT SER IF-IMP: ABNORMAL
ANA PAT SER IF-IMP: ABNORMAL
ANA SER QL IF: POSITIVE
ANA TITR SER IF: ABNORMAL TITER
ANA TITR SER IF: ABNORMAL TITER
CCP IGG SERPL-ACNC: <16 UNITS
CRP SERPL-MCNC: <3 MG/L
DSDNA AB SER-ACNC: <1 IU/ML
ENA RNP AB SER-ACNC: ABNORMAL AI
ENA SM AB SER IA-ACNC: ABNORMAL AI
ENA SM+RNP AB SER IA-ACNC: ABNORMAL AI
ERYTHROCYTE [SEDIMENTATION RATE] IN BLOOD BY WESTERGREN METHOD: 9 MM/H
HBV CORE AB SERPL QL IA: NORMAL
HBV SURFACE AB SERPL IA-ACNC: <5 MIU/ML
HBV SURFACE AG SERPL QL IA: NORMAL
HCV AB SERPL QL IA: NORMAL
HLA-B27 QL NAA+PROBE: NEGATIVE
IGNF NEG CNTRL BLD: NORMAL
LABORATORY COMMENT REPORT: ABNORMAL
M TB IFN-G BLD-IMP: NEGATIVE
MITOGEN IGNF.SPOT COUNT BLD: NORMAL
NUCLEOSOME AB SER IA-ACNC: ABNORMAL AI
QUEST HLAB27 TYPING RESULTS REVIEWED BY:: NORMAL
QUEST PANEL A SPOT COUNT: 0
QUEST PANEL B SPOT COUNT: 0
RHEUMATOID FACT SERPL-ACNC: <10 IU/ML

## 2025-03-17 ENCOUNTER — OFFICE VISIT (OUTPATIENT)
Dept: RHEUMATOLOGY | Facility: CLINIC | Age: 55
End: 2025-03-17
Payer: COMMERCIAL

## 2025-03-17 VITALS
WEIGHT: 217 LBS | DIASTOLIC BLOOD PRESSURE: 83 MMHG | HEART RATE: 74 BPM | OXYGEN SATURATION: 100 % | SYSTOLIC BLOOD PRESSURE: 141 MMHG | BODY MASS INDEX: 37.25 KG/M2

## 2025-03-17 DIAGNOSIS — M77.9 ENTHESITIS: ICD-10-CM

## 2025-03-17 DIAGNOSIS — M25.50 POLYARTHRALGIA: Primary | ICD-10-CM

## 2025-03-17 DIAGNOSIS — Z87.39 HISTORY OF RHEUMATOID ARTHRITIS: ICD-10-CM

## 2025-03-17 PROCEDURE — 1036F TOBACCO NON-USER: CPT | Performed by: INTERNAL MEDICINE

## 2025-03-17 PROCEDURE — 99215 OFFICE O/P EST HI 40 MIN: CPT | Performed by: INTERNAL MEDICINE

## 2025-03-17 RX ORDER — MELOXICAM 15 MG/1
15 TABLET ORAL DAILY
Qty: 30 TABLET | Refills: 0 | Status: SHIPPED | OUTPATIENT
Start: 2025-03-17 | End: 2025-04-16

## 2025-03-17 ASSESSMENT — ENCOUNTER SYMPTOMS
OCCASIONAL FEELINGS OF UNSTEADINESS: 0
DEPRESSION: 0
LOSS OF SENSATION IN FEET: 0

## 2025-03-17 ASSESSMENT — PAIN SCALES - GENERAL: PAINLEVEL_OUTOF10: 2

## 2025-03-17 NOTE — PROGRESS NOTES
Subjective   Patient ID: [id number]  Margie Romero is a 54 y.o. female who presents for Follow-up.  HPI  PMH/PSH: Seizures on lamictal, GERD, on nexium as needed, hysterectomy   Social: Has 3 children   FHx: No family history of autoimmune diseases     Recall:  She was seeing Dr. Elam and then Dr. Soto and is here to re-establish care.   She was initially diagnosed by Dr. Agudelo in 2002, she was having plantar fascitis, b/l knee pain, worse with steps and activity and sometimes get better with rest, b/l hip pain especially with lying on her sides. She was diagnosed with RA. She was initially started on methotrexate without much improvement. She was then switched to orencia.      She was on orencia monthly for RA, then became an issue with buy and bill and has been off it.  At some point she was also on combo LEF + orencia, but she developed nausea.   Her last orencia was January 2024 and then she was switched to the orencia subcutaneous injections but didn't feel as well on the subcutaneous injections but developed injection site reactions too and would feel that her skin was so tight.      Patient reports that she felt no difference on methotrexate, leflunomide, orencia.      First visit 3/3:  She feels stiff in her hands worse in AM and improves as day goes by. No pain or swelling in MCPs, PIPs.   She reports aching in the wrists after long day of work, she is a  at [employer].  She reports hips are achy L>R, lateral, she was referred to PT at some point for bursitis and it helped.   Had issues with L elbow epicondylitis  She did PT for shoulder, shoulders feel ok for the most part.   Knees feel ok, but they crack and crunch.   Ankles have been swollen the last couple of weeks, worse towards end of the day after work.   R foot longstanding plantar fascitis and is limping first thing in AM.   Has chronic low back pain, difficult to get up in AM, started after hysterectomy but has improved and has had 2 lumbar BERNADETTE  by pain management and they help. Last one was in July and the effect is lasting.      She reports these symptoms have been pretty much the same before and after the orencia.   Reports medrol dose pack helps her symptoms, she doesn't feel much pain and has the ability to ambulate more, and helps the stiffnesss  No enthesitis    Interval Hx:   She is dealing with plantar fasciitis, what helps that is staying off her foot. She was prescribed a prednisone course by her podiatrist without relief. She is planned to get lumbar BERNADETTE. Also discussed US guided plantar CSI     Denies fever, chills, weight loss, night sweats or headaches. No fatigue, not refreshed in AM. Denies dry eyes, blurry vision, redness or pain or photophobia. Denies dry mouth, dental loss, loss of taste, nasal or oral ulcers, difficulty swallowing, recurrent sinus infections. Denies chest pain. Denies shortness of breath, cough, asthma, or recurrent respiratory infections. Denies nausea, vomiting, heartburn, abdominal pain, constipation, diarrhea, melena or hematochezia. No recurrent urinary infections or STDs, no genital or anal ulcers. Denies photosensitivity, rash or lesions, Raynaud's phenomenon, psoriatic lesions. Denies any numbness or tingling. Denies history of clots (arterial or venous). 1 1st trimester miscarriage before pregnancies.     Patient Active Problem List   Diagnosis    Anxiety    Asthenia    Slow transit constipation    Degeneration of lumbosacral intervertebral disc    Dyspnea    Frequency of urination    Insomnia    Low back pain    Rheumatoid arthritis    Seizure disorder (Multi)    Thrombosed external hemorrhoids    Urinary retention    Complex partial seizures evolving to generalized tonic-clonic seizures (Multi)    Hx of transient ischemic attack (TIA)       Past Medical History:   Diagnosis Date    Arthritis     Cholecystitis 09/12/2023    Flank pain 09/12/2023    Pure hypercholesterolemia 09/12/2023    Seizures (Multi)      Transient ischemic attack 09/12/2023       Past Surgical History:   Procedure Laterality Date    MR HEAD ANGIO WO IV CONTRAST  4/16/2015    MR HEAD ANGIO WO IV CONTRAST [facility]       Social History     Socioeconomic History    Marital status:      Spouse name: Not on file    Number of children: Not on file    Years of education: Not on file    Highest education level: Not on file   Occupational History    Not on file   Tobacco Use    Smoking status: Never     Passive exposure: Never    Smokeless tobacco: Never   Vaping Use    Vaping status: Never Used   Substance and Sexual Activity    Alcohol use: Not Currently    Drug use: Never    Sexual activity: Not on file   Other Topics Concern    Not on file   Social History Narrative    Not on file     Social Drivers of Health     Financial Resource Strain: Not on file   Food Insecurity: Not on file   Transportation Needs: Not on file   Physical Activity: Not on file   Stress: Not on file   Social Connections: Not on file   Intimate Partner Violence: Not on file   Housing Stability: Not on file       No Known Allergies      Current Outpatient Medications:     amitriptyline (Elavil) 10 mg tablet, Take 1 tablet (10 mg) by mouth once daily at bedtime., Disp: , Rfl:     cholecalciferol, vitamin D3, (VITAMIN D3 ORAL), 2000 UNIT  - 1 capsule Orally Once a day, Disp: , Rfl:     cyanocobalamin, vitamin B-12, 3,000 mcg/mL drops, as directed Sublingual, Disp: , Rfl:     docusate sodium (Colace) 100 mg capsule, Take 1 capsule (100 mg) by mouth 2 times a day as needed., Disp: , Rfl:     esomeprazole (NexIUM) 20 mg DR capsule, Take 1 capsule (20 mg) by mouth if needed., Disp: , Rfl:     lamoTRIgine (LaMICtal) 150 mg tablet, 1 tablet Orally Twice a day, Disp: , Rfl:     ondansetron (Zofran) 4 mg tablet, Take 1 tablet (4 mg) by mouth every 8 hours if needed., Disp: , Rfl:     valACYclovir (Valtrex) 500 mg tablet, Take 1 tablet (500 mg) by mouth once daily., Disp: ,  Rfl:     cyclobenzaprine (Flexeril) 10 mg tablet, Take 1 tablet (10 mg) by mouth as needed at bedtime for muscle spasms., Disp: 30 tablet, Rfl: 0    Objective     Visit Vitals  /83 (BP Location: Left arm, Patient Position: Sitting)   Pulse 74       Physical Exam  Copied from previous exam unless annotated below  General: AAOx3, Cooperative  Throat/Mouth: No oral deformities, no cheek swelling, mucosa appear dry, no oral ulcers noted   Neuro: CN II-XII grossly intact, no focal deficit  Skin: No rashes, ulcers or photosensitive areas  MSK: Upper Extremities:  Hand/Fingers: No erythema, swelling, tenderness or warmth at DIP, PIP, or MCP joints, FROM grossly. Good hand . No nodules. No deformities   Wrists: No erythema, swelling, warmth or tenderness at wrist, FROM grossly  Elbows: lateral epicondyle tenderness to palpation bilaterally, not exacerbated by resisted wrist flexion or extension.   Shoulders: FROM  Lower Extremities:   Hips: No obvious deformities. + ROSE MARY on the L.   Knees: No tenderness, deformities, swelling, rashes, or warmth, normal ROM grossly. + crepitus  Ankles: + Soft tissue swelling, no deformities, tenderness,  erythema, ulceration, or warmth at the ankle. + achilles enthesitis on the R, + plantar fasciitis on the L.   Feet: Negative MTP squeeze. Normal ROM grossly.   Spine: No spinal tenderness to palpation.      Lab Results   Component Value Date    WBC 8.0 01/09/2024    HGB 14.4 01/09/2024    HCT 43.7 01/09/2024    MCV 92 01/09/2024     01/09/2024       Chemistry    Lab Results   Component Value Date/Time     01/09/2024 0839    K 4.4 01/09/2024 0839     01/09/2024 0839    CO2 22 (L) 01/09/2024 0839    BUN 13 01/09/2024 0839    CREATININE 0.80 01/09/2024 0839    Lab Results   Component Value Date/Time    CALCIUM 9.4 01/09/2024 0839    ALKPHOS 93 01/09/2024 0839    AST 31 01/09/2024 0839    ALT 18 01/09/2024 0839    BILITOT 0.3 01/09/2024 0839          Lab Results    Component Value Date    CRP <3.0 03/03/2025    BRIONNA POSITIVE (A) 03/03/2025    CALCIUM 9.4 01/09/2024    PHOS 3.6 11/03/2020    FERRITIN 72 01/11/2022     Alkaline Phosphatase   Date Value Ref Range Status   01/09/2024 93 35 - 125 U/L Final     AST   Date Value Ref Range Status   01/09/2024 31 5 - 40 U/L Final     Urea Nitrogen   Date Value Ref Range Status   01/09/2024 13 8 - 25 mg/dL Final     Sodium   Date Value Ref Range Status   01/09/2024 140 133 - 145 mmol/L Final     Potassium   Date Value Ref Range Status   01/09/2024 4.4 3.4 - 5.1 mmol/L Final     Bicarbonate   Date Value Ref Range Status   01/09/2024 22 (L) 24 - 31 mmol/L Final     ALT   Date Value Ref Range Status   01/09/2024 18 5 - 40 U/L Final     Vitamin D, 25-Hydroxy, Total   Date Value Ref Range Status   01/09/2024 45 31 - 100 ng/mL Final       XR elbow 3+ views bilateral  Narrative: Interpreted By:  Neo Estevez,   STUDY:  XR ELBOW 3+ VIEWS BILATERAL; ;  3/3/2025 5:02 pm      INDICATION:  Signs/Symptoms:evaluate for inflammatory arthritis.      ,M25.50 Pain in unspecified joint,Z87.39 Personal history of other  diseases of the musculoskeletal system and connective tissue      COMPARISON:  None.      ACCESSION NUMBER(S):  [id number]      ORDERING CLINICIAN:  HUGO JANE      FINDINGS:  Bilateral elbows, four views of each knee      There is no fracture. There is no dislocation. There are no  degenerative changes. There is no lytic or sclerotic lesion. There is  no soft tissue abnormality seen. There is no effusion      Impression: Normal radiographs of the elbows          MACRO:  None      Signed by: Neo Estevez 3/4/2025 6:17 PM  Dictation workstation:   [id number]  XR hips bilateral 2 VW w pelvis when performed  Narrative: Interpreted By:  Neo Estevez,   STUDY:  XR HIPS BILATERAL 2 VW WITH PELVIS WHEN PERFORMED; ;  3/3/2025 5:02 pm      INDICATION:  Signs/Symptoms:evaluate for inflammatory arthritis vs OA, CAM  deformities.       ,M25.50 Pain in unspecified joint,Z87.39 Personal history of other  diseases of the musculoskeletal system and connective tissue      COMPARISON:  None.      ACCESSION NUMBER(S):  [id number]      ORDERING CLINICIAN:  HUGO JANE      FINDINGS:  Bilateral hips, four views      There is no fracture. There is no dislocation. There are no  degenerative changes. There is no lytic or sclerotic lesion. There is  no soft tissue abnormality seen.      Impression: Normal radiographs of the hips          MACRO:  None      Signed by: Neo Estevez 3/4/2025 6:12 PM  Dictation workstation:   [id number]  XR sacroiliac joints 3+ views  Narrative: Interpreted By:  Neo Estevez,   STUDY:  XR SACROILIAC JOINTS 3+ VIEWS; ;  3/3/2025 5:02 pm      INDICATION:  Signs/Symptoms:evaluate for sacroilitis, OA.      ,M25.50 Pain in unspecified joint,Z87.39 Personal history of other  diseases of the musculoskeletal system and connective tissue      COMPARISON:  None.      ACCESSION NUMBER(S):  [id number]      ORDERING CLINICIAN:  HUGO JANE      FINDINGS:  SI joints, four views      There is no fracture. There is no dislocation. There are no  degenerative changes. No sclerosis or erosions seen. There is no  lytic or sclerotic lesion. There is no soft tissue abnormality seen.      Impression: No radiographic evidence of sacroiliitis.          MACRO:  None      Signed by: Neo Estevez 3/4/2025 6:08 PM  Dictation workstation:   [id number]  XR foot 3+ views bilateral  Narrative: Interpreted By:  Neo Estevez,   STUDY:  XR FOOT 3+ VIEWS BILATERAL; ;  3/3/2025 5:02 pm      INDICATION:  Signs/Symptoms:evaluate for inflammatory arthritis, OA.      ,M25.50 Pain in unspecified joint,Z87.39 Personal history of other  diseases of the musculoskeletal system and connective tissue      COMPARISON:  None.      ACCESSION NUMBER(S):  [id number]      ORDERING CLINICIAN:  HUGO JANE      FINDINGS:  Bilateral feet, three views of each  hip      There is no fracture. There is no dislocation. There are no  degenerative changes. There is no lytic or sclerotic lesion. There is  no soft tissue abnormality seen. Bilateral small plantar calcaneal  enthesophytes.      Impression: Normal radiographs of the feet          MACRO:  None      Signed by: Neo Estevez 3/4/2025 6:05 PM  Dictation workstation:   [id number]  XR hand 3+ views bilateral  Narrative: Interpreted By:  Neo Estevez,   STUDY:  XR HAND 3+ VIEWS BILATERAL; ;  3/3/2025 5:02 pm      INDICATION:  Signs/Symptoms:evaluate for inflammatory arthritis changes vs OA.      ,M25.50 Pain in unspecified joint,Z87.39 Personal history of other  diseases of the musculoskeletal system and connective tissue      COMPARISON:  None.      ACCESSION NUMBER(S):  [id number]      ORDERING CLINICIAN:  HUGO JANE      FINDINGS:  Bilateral hands, three views of each      There is no fracture. There is no dislocation. Mild osteophytosis in  the 1st CMC joints bilaterally. No other degenerative change seen. No  erosions or chondrocalcinosis. There is no lytic or sclerotic lesion.  There is no soft tissue abnormality seen.      Impression: No acute abnormality seen in either hand. Mild degenerative changes  at the base of the thumb bilaterally          MACRO:  None      Signed by: Neo Estevez 3/4/2025 5:50 PM  Dictation workstation:   [id number]    XR hand right 3+ views  PROCEDURE:         HAND RT MIN 3 VIEW - NXR  0055  REASON FOR EXAM: pain x 1 month     RESULT: MRN: [id number]  Patient Name: THUY GOLDSTEIN     STUDY:  HAND RT MIN 3 VIEW 9/15/2023 11:06 am     INDICATION:  pain x 1 month right hand pain for 1 month intermittently. No known trauma.     COMPARISON:  None available.     ACCESSION NUMBER(S):  [id number]     ORDERING CLINICIAN:  ESTHER ALEGRIA     TECHNIQUE:  Three views of the right hand were performed.     FINDINGS:  No acute fractures or dislocation are seen. Joint spaces overall  appear  maintained.     IMPRESSION:  No sign of acute right hand fracture or dislocation.     MACRO:  None.  Dictation workstation:   [id number]     Original Interpreting Physician:   NISHA SANITAGO MD  Original Transcribed by/Date: MMODAL Sep 15 2023 10:59A  Original Electronically Signed by/Date: NISHA SANTIAGO MD Sep 15 2023 11:08A     Addendum Interpreting Physician:  Addendum Transcribed by/Date: NO ADDENDUM  Addendum Electronically Signed by/Date:     === 09/15/23 ===     XR HAND 3+ VIEWS RIGHT     - Impression -  No sign of acute right hand fracture or dislocation.     MACRO:  None.  Dictation workstation:   [id number]     Original Interpreting Physician:   NISHA SANTIAGO MD  Original Transcribed by/Date: MMODAL Sep 15 2023 10:59A  Original Electronically Signed by/Date: NISHA SANTIAGO MD Sep 15 2023 11:08A     Addendum Interpreting Physician:  Addendum Transcribed by/Date: NO ADDENDUM  Addendum Electronically Signed by/Date:   === 06/16/22 ===     MR LUMBAR SPINE WO CONTRAST     - Impression -  1. Multilevel degenerative discogenic changes within the lumbar spine severe  loss disc space height and endplate reactive changes L5/S1.  2. Asymmetric right paracentral disc osteophyte complex L5/S1 with mild  asymmetric narrowing right lateral recess abutting the emerging right S2  nerve root. No displacement.     This report has been produced using speech recognition.     Original Interpreting Physician:   MARTINEZ ASHTON MD  Original Transcribed by/Date: [facility]   Jun 16 2022  1:01P  Original Electronically Signed by/Date: MARTINEZ ASHTON MD Jun 16 2022  1:01P     Addendum Interpreting Physician:  Addendum Transcribed by/Date: NO ADDENDUM  Addendum Electronically Signed by/Date:        Assessment/Plan   A 54 year old F here with previous diagnosis of RA.      Has enthesitis of R plantar fascia, achilles, b/l lateral epicondyles, L GTB.      No synovitis of hands or feet.       Labs 3/3 reviewed:  ESR and CRP  normal   RF and CCP neg  Hep neg  BRIONNA 1:80, chromatin 1   HLA B27 neg    Labs from 1/9/2024 reviewed:  CBC  and CMPnormal  ESR and CRP normal     XR 3/4 reviewed  XR hand: Mild degenerative changes  at the base of the thumb bilaterally  XR feet: Bilateral small plantar calcaneal enthesophytes.  SI: normal  Hips: normal   Elbows: normal    - Since no evidence of inflamamtory arthritis, will hold off on IS. Patient reports no improvement in the past with csDMARDs or orencia. Trial meloxicam.   - Counterforce braces for b/l lateral epicondylitis  - She has follow up with pain management for lumbar BERNADETTE, also discussed plantar CSI      RTC in 4 months or sooner  She will inform me of any flares        Zuleika Skelton MD  Division of Rheumatology   [facility]

## 2025-04-04 DIAGNOSIS — M25.50 POLYARTHRALGIA: ICD-10-CM

## 2025-04-04 RX ORDER — MELOXICAM 15 MG/1
15 TABLET ORAL DAILY
Qty: 21 TABLET | Refills: 1 | Status: SHIPPED | OUTPATIENT
Start: 2025-04-04

## 2025-04-18 ENCOUNTER — OFFICE VISIT (OUTPATIENT)
Dept: PRIMARY CARE | Facility: CLINIC | Age: 55
End: 2025-04-18
Payer: COMMERCIAL

## 2025-04-18 VITALS — HEART RATE: 78 BPM | DIASTOLIC BLOOD PRESSURE: 95 MMHG | SYSTOLIC BLOOD PRESSURE: 130 MMHG | OXYGEN SATURATION: 98 %

## 2025-04-18 DIAGNOSIS — Z12.31 ENCOUNTER FOR SCREENING MAMMOGRAM FOR MALIGNANT NEOPLASM OF BREAST: ICD-10-CM

## 2025-04-18 DIAGNOSIS — L50.6 ALLERGIC CONTACT URTICARIA: Primary | ICD-10-CM

## 2025-04-18 DIAGNOSIS — B37.2 YEAST DERMATITIS: ICD-10-CM

## 2025-04-18 PROCEDURE — 99213 OFFICE O/P EST LOW 20 MIN: CPT | Performed by: NURSE PRACTITIONER

## 2025-04-18 PROCEDURE — 1036F TOBACCO NON-USER: CPT | Performed by: NURSE PRACTITIONER

## 2025-04-18 RX ORDER — TRIAMCINOLONE ACETONIDE 40 MG/ML
40 INJECTION, SUSPENSION INTRA-ARTICULAR; INTRAMUSCULAR ONCE
Status: SHIPPED | OUTPATIENT
Start: 2025-04-18

## 2025-04-18 RX ORDER — HYDROXYZINE HYDROCHLORIDE 10 MG/1
10 TABLET, FILM COATED ORAL 3 TIMES DAILY
Qty: 30 TABLET | Refills: 0 | Status: SHIPPED | OUTPATIENT
Start: 2025-04-18 | End: 2025-05-18

## 2025-04-18 RX ORDER — CLOTRIMAZOLE AND BETAMETHASONE DIPROPIONATE 10; .64 MG/G; MG/G
1 CREAM TOPICAL 2 TIMES DAILY
Qty: 15 G | Refills: 0 | Status: SHIPPED | OUTPATIENT
Start: 2025-04-18 | End: 2025-06-17

## 2025-04-18 ASSESSMENT — ENCOUNTER SYMPTOMS
OCCASIONAL FEELINGS OF UNSTEADINESS: 0
DEPRESSION: 0
LOSS OF SENSATION IN FEET: 0

## 2025-04-18 ASSESSMENT — PATIENT HEALTH QUESTIONNAIRE - PHQ9
SUM OF ALL RESPONSES TO PHQ9 QUESTIONS 1 AND 2: 0
1. LITTLE INTEREST OR PLEASURE IN DOING THINGS: NOT AT ALL
2. FEELING DOWN, DEPRESSED OR HOPELESS: NOT AT ALL

## 2025-04-18 ASSESSMENT — PAIN SCALES - GENERAL: PAINLEVEL_OUTOF10: 0-NO PAIN

## 2025-04-18 NOTE — PROGRESS NOTES
Subjective   Patient ID: Margie Romero is a 54 y.o. female who presents for Itchy  (Patient here for rash and itchiness all over ).    HPI Was on family vacation in [place] stayed in home did not go to [place]  Back of head, and under breast, left breastand random itching points on ankles and low back   Had injection in back in epi in back for plantar fascitis and back pain   Review of Systems   HENT:  Positive for postnasal drip.    Skin:  Positive for rash.       Objective   BP (!) 130/95 (BP Location: Left arm, Patient Position: Sitting, BP Cuff Size: Adult)   Pulse 78   SpO2 98%     Physical Exam  Constitutional:       General: She is not in acute distress.     Appearance: Normal appearance.   Cardiovascular:      Rate and Rhythm: Normal rate and regular rhythm.      Heart sounds: No murmur heard.  Pulmonary:      Breath sounds: Normal breath sounds. No wheezing.   Skin:     Comments: Under breast erthyma, no obvious hives noted,    Neurological:      Mental Status: She is alert.         Assessment/Plan   Problem List Items Addressed This Visit    None  Visit Diagnoses         Codes      Allergic contact urticaria    -  Primary L50.6    Relevant Medications    triamcinolone acetonide (Kenalog-40) injection 40 mg (Start on 4/18/2025  4:15 PM)    hydrOXYzine HCL (Atarax) 10 mg tablet      Encounter for screening mammogram for malignant neoplasm of breast     Z12.31    Relevant Orders    BI mammo bilateral screening tomosynthesis      Yeast dermatitis     B37.2    Relevant Medications    clotrimazole-betamethasone (Lotrisone) cream

## 2025-05-06 ENCOUNTER — HOSPITAL ENCOUNTER (OUTPATIENT)
Dept: RADIOLOGY | Facility: HOSPITAL | Age: 55
Discharge: HOME | End: 2025-05-06
Payer: COMMERCIAL

## 2025-05-06 DIAGNOSIS — Z12.31 ENCOUNTER FOR SCREENING MAMMOGRAM FOR MALIGNANT NEOPLASM OF BREAST: ICD-10-CM

## 2025-05-06 PROCEDURE — 77067 SCR MAMMO BI INCL CAD: CPT | Performed by: RADIOLOGY

## 2025-05-06 PROCEDURE — 77063 BREAST TOMOSYNTHESIS BI: CPT | Performed by: RADIOLOGY

## 2025-05-06 PROCEDURE — 77067 SCR MAMMO BI INCL CAD: CPT

## 2025-07-14 ENCOUNTER — OFFICE VISIT (OUTPATIENT)
Facility: CLINIC | Age: 55
End: 2025-07-14
Payer: COMMERCIAL

## 2025-07-14 VITALS
DIASTOLIC BLOOD PRESSURE: 87 MMHG | WEIGHT: 217.8 LBS | OXYGEN SATURATION: 97 % | BODY MASS INDEX: 37.39 KG/M2 | SYSTOLIC BLOOD PRESSURE: 137 MMHG | HEART RATE: 70 BPM

## 2025-07-14 DIAGNOSIS — M25.50 POLYARTHRALGIA: ICD-10-CM

## 2025-07-14 DIAGNOSIS — M77.9 ENTHESITIS: Primary | ICD-10-CM

## 2025-07-14 DIAGNOSIS — Z87.39 HISTORY OF RHEUMATOID ARTHRITIS: ICD-10-CM

## 2025-07-14 PROCEDURE — 1036F TOBACCO NON-USER: CPT | Performed by: INTERNAL MEDICINE

## 2025-07-14 PROCEDURE — 99215 OFFICE O/P EST HI 40 MIN: CPT | Performed by: INTERNAL MEDICINE

## 2025-07-14 ASSESSMENT — PATIENT HEALTH QUESTIONNAIRE - PHQ9
1. LITTLE INTEREST OR PLEASURE IN DOING THINGS: NOT AT ALL
2. FEELING DOWN, DEPRESSED OR HOPELESS: NOT AT ALL
SUM OF ALL RESPONSES TO PHQ9 QUESTIONS 1 AND 2: 0

## 2025-07-14 ASSESSMENT — ENCOUNTER SYMPTOMS
DEPRESSION: 0
OCCASIONAL FEELINGS OF UNSTEADINESS: 0
LOSS OF SENSATION IN FEET: 0

## 2025-07-14 NOTE — PROGRESS NOTES
Subjective   Patient ID: [id number]  Margie Romero is a 54 y.o. female who presents for Follow-up.  HPI  PMH/PSH: Seizures on lamictal, GERD, on nexium as needed, hysterectomy   Social: Has 3 children   FHx: No family history of autoimmune diseases     Recall:  She was seeing Dr. Elam and then Dr. Soto and is here to re-establish care.   She was initially diagnosed by Dr. Agudelo in 2002, she was having plantar fascitis, b/l knee pain, worse with steps and activity and sometimes get better with rest, b/l hip pain especially with lying on her sides. She was diagnosed with RA. She was initially started on methotrexate without much improvement. She was then switched to orencia.      She was on orencia monthly for RA, then became an issue with buy and bill and has been off it.  At some point she was also on combo LEF + orencia, but she developed nausea.   Her last orencia was January 2024 and then she was switched to the orencia subcutaneous injections but didn't feel as well on the subcutaneous injections but developed injection site reactions too and would feel that her skin was so tight.      Patient reports that she felt no difference on methotrexate, leflunomide, orencia.      First visit 3/3:  She feels stiff in her hands worse in AM and improves as day goes by. No pain or swelling in MCPs, PIPs.   She reports aching in the wrists after long day of work, she is a  at [employer].  She reports hips are achy L>R, lateral, she was referred to PT at some point for bursitis and it helped.   Had issues with L elbow epicondylitis  She did PT for shoulder, shoulders feel ok for the most part.   Knees feel ok, but they crack and crunch.   Ankles have been swollen the last couple of weeks, worse towards end of the day after work.   R foot longstanding plantar fascitis and is limping first thing in AM.   Has chronic low back pain, difficult to get up in AM, started after hysterectomy but has improved and has had 2 lumbar BERNADETTE  by pain management and they help. Last one was in July and the effect is lasting.      She reports these symptoms have been pretty much the same before and after the orencia.   Reports medrol dose pack helps her symptoms, she doesn't feel much pain and has the ability to ambulate more, and helps the stiffnesss  No enthesitis    Interval Hx:   Lumbar BERNADETTE with relief for 3 weeks. Before she went to [place] she had a R plantar fascia CSI and a couple of weeks later it returned.   She is having stiffness in her hands.   Elbows and shoulders are ok today but the L hip is bothersome.     Denies fever, chills, weight loss, night sweats or headaches. No fatigue, not refreshed in AM. Denies dry eyes, blurry vision, redness or pain or photophobia. Denies dry mouth, dental loss, loss of taste, nasal or oral ulcers, difficulty swallowing, recurrent sinus infections. Denies chest pain. Denies shortness of breath, cough, asthma, or recurrent respiratory infections. Denies nausea, vomiting, heartburn, abdominal pain, constipation, diarrhea, melena or hematochezia. No recurrent urinary infections or STDs, no genital or anal ulcers. Denies photosensitivity, rash or lesions, Raynaud's phenomenon, psoriatic lesions. Denies any numbness or tingling. Denies history of clots (arterial or venous). 1 1st trimester miscarriage before pregnancies.     Patient Active Problem List   Diagnosis    Anxiety    Asthenia    Slow transit constipation    Degeneration of lumbosacral intervertebral disc    Dyspnea    Frequency of urination    Insomnia    Low back pain    Rheumatoid arthritis    Seizure disorder (Multi)    Thrombosed external hemorrhoids    Urinary retention    Complex partial seizures evolving to generalized tonic-clonic seizures (Multi)    Hx of transient ischemic attack (TIA)       Past Medical History:   Diagnosis Date    Arthritis     Cholecystitis 09/12/2023    Flank pain 09/12/2023    Pure hypercholesterolemia 09/12/2023    Seizures  (Multi)     Transient ischemic attack 09/12/2023       Past Surgical History:   Procedure Laterality Date    HYSTERECTOMY  oct 2021    MR HEAD ANGIO WO IV CONTRAST  04/16/2015    MR HEAD ANGIO WO IV CONTRAST [facility]       Social History     Socioeconomic History    Marital status:      Spouse name: Not on file    Number of children: Not on file    Years of education: Not on file    Highest education level: Not on file   Occupational History    Not on file   Tobacco Use    Smoking status: Never     Passive exposure: Never    Smokeless tobacco: Never   Vaping Use    Vaping status: Never Used   Substance and Sexual Activity    Alcohol use: Not Currently    Drug use: Never    Sexual activity: Not on file   Other Topics Concern    Not on file   Social History Narrative    Not on file     Social Drivers of Health     Financial Resource Strain: Not on file   Food Insecurity: Not on file   Transportation Needs: Not on file   Physical Activity: Not on file   Stress: Not on file   Social Connections: Not on file   Intimate Partner Violence: Not on file   Housing Stability: Not on file       No Known Allergies      Current Outpatient Medications:     amitriptyline (Elavil) 10 mg tablet, Take 1 tablet (10 mg) by mouth once daily at bedtime., Disp: , Rfl:     cholecalciferol, vitamin D3, (VITAMIN D3 ORAL), 2000 UNIT  - 1 capsule Orally Once a day, Disp: , Rfl:     cyanocobalamin, vitamin B-12, 3,000 mcg/mL drops, as directed Sublingual, Disp: , Rfl:     docusate sodium (Colace) 100 mg capsule, Take 1 capsule (100 mg) by mouth 2 times a day as needed., Disp: , Rfl:     esomeprazole (NexIUM) 20 mg DR capsule, Take 1 capsule (20 mg) by mouth if needed., Disp: , Rfl:     lamoTRIgine (LaMICtal) 150 mg tablet, 1 tablet Orally Twice a day, Disp: , Rfl:     meloxicam (Mobic) 15 mg tablet, TAKE 1 TABLET BY MOUTH EVERY DAY, Disp: 21 tablet, Rfl: 1    ondansetron (Zofran) 4 mg tablet, Take 1 tablet (4 mg) by mouth every  8 hours if needed., Disp: , Rfl:     valACYclovir (Valtrex) 500 mg tablet, Take 1 tablet (500 mg) by mouth once daily., Disp: , Rfl:     cyclobenzaprine (Flexeril) 10 mg tablet, Take 1 tablet (10 mg) by mouth as needed at bedtime for muscle spasms., Disp: 30 tablet, Rfl: 0    hydrOXYzine HCL (Atarax) 10 mg tablet, Take 1 tablet (10 mg) by mouth 3 times a day., Disp: 30 tablet, Rfl: 0    Current Facility-Administered Medications:     triamcinolone acetonide (Kenalog-40) injection 40 mg, 40 mg, intramuscular, Once, Vanda Camarillo, APRN-CNP    Objective     Visit Vitals  /87 (BP Location: Left arm, Patient Position: Sitting)   Pulse 70       Physical Exam  Copied from previous exam unless annotated below  General: AAOx3, Cooperative  Throat/Mouth: No oral deformities, no cheek swelling, mucosa appear dry, no oral ulcers noted   Neuro: CN II-XII grossly intact, no focal deficit  Skin: No rashes, ulcers or photosensitive areas  MSK: Upper Extremities:  Hand/Fingers: No erythema, swelling, tenderness or warmth at DIP, PIP, or MCP joints, FROM grossly. Good hand . No nodules. No deformities   Wrists: No erythema, swelling, warmth or tenderness at wrist, FROM grossly  Elbows: L lateral epicondyle tenderness to palpation, not on the R today, exacerbated by resisted wrist flexion or extension.   Shoulders: FROM  Lower Extremities:   Hips: No obvious deformities. + ROSE MARY on the L.   Knees: No tenderness, deformities, swelling, rashes, or warmth, normal ROM grossly. + crepitus  Ankles: + Soft tissue swelling, no deformities, tenderness,  erythema, ulceration, or warmth at the ankle. + achilles enthesitis on the R  Feet: Negative MTP squeeze. Normal ROM grossly.   Spine: No spinal tenderness to palpation.      Lab Results   Component Value Date    WBC 8.0 01/09/2024    HGB 14.4 01/09/2024    HCT 43.7 01/09/2024    MCV 92 01/09/2024     01/09/2024       Chemistry    Lab Results   Component Value Date/Time    NA  140 01/09/2024 0839    K 4.4 01/09/2024 0839     01/09/2024 0839    CO2 22 (L) 01/09/2024 0839    BUN 13 01/09/2024 0839    CREATININE 0.80 01/09/2024 0839    Lab Results   Component Value Date/Time    CALCIUM 9.4 01/09/2024 0839    ALKPHOS 93 01/09/2024 0839    AST 31 01/09/2024 0839    ALT 18 01/09/2024 0839    BILITOT 0.3 01/09/2024 0839          Lab Results   Component Value Date    CRP <3.0 03/03/2025    BRIONNA POSITIVE (A) 03/03/2025    CALCIUM 9.4 01/09/2024    PHOS 3.6 11/03/2020    FERRITIN 72 01/11/2022     Alkaline Phosphatase   Date Value Ref Range Status   01/09/2024 93 35 - 125 U/L Final     AST   Date Value Ref Range Status   01/09/2024 31 5 - 40 U/L Final     Urea Nitrogen   Date Value Ref Range Status   01/09/2024 13 8 - 25 mg/dL Final     Sodium   Date Value Ref Range Status   01/09/2024 140 133 - 145 mmol/L Final     Potassium   Date Value Ref Range Status   01/09/2024 4.4 3.4 - 5.1 mmol/L Final     Bicarbonate   Date Value Ref Range Status   01/09/2024 22 (L) 24 - 31 mmol/L Final     ALT   Date Value Ref Range Status   01/09/2024 18 5 - 40 U/L Final     Vitamin D, 25-Hydroxy, Total   Date Value Ref Range Status   01/09/2024 45 31 - 100 ng/mL Final       BI mammo bilateral screening tomosynthesis  Narrative: Interpreted By:  Iesha Villavicencio,   STUDY:  BI MAMMO BILATERAL SCREENING TOMOSYNTHESIS;  5/6/2025 8:42 am      ACCESSION NUMBER(S):  [id number]      ORDERING CLINICIAN:  SHILO RODRIGUEZ      INDICATION:  Screening.      ,Z12.31 Encounter for screening mammogram for malignant neoplasm of  breast      COMPARISON:  08/18/2023, 06/16/2022      FINDINGS:  2D and tomosynthesis images were reviewed at 1 mm slice thickness.      Density:  The breasts are almost entirely fatty.      No suspicious masses or calcifications are identified.      Impression: No mammographic evidence of malignancy.      BI-RADS CATEGORY:  BI-RADS Category:  1 Negative.  Recommendation:  Annual Screening.  Recommended  Date:  1 Year.  Laterality:  Bilateral.              For any future breast imaging appointments, please call [phone]).          MACRO:  None      Signed by: Iesha Villavicencio 5/8/2025 11:27 AM  Dictation workstation:   [id number]    XR hand right 3+ views  PROCEDURE:         HAND RT MIN 3 VIEW - NXR  0055  REASON FOR EXAM: pain x 1 month     RESULT: MRN: [id number]  Patient Name: THUY GOLDSTEIN     STUDY:  HAND RT MIN 3 VIEW 9/15/2023 11:06 am     INDICATION:  pain x 1 month right hand pain for 1 month intermittently. No known trauma.     COMPARISON:  None available.     ACCESSION NUMBER(S):  [id number]     ORDERING CLINICIAN:  ESTHER ALEGRIA     TECHNIQUE:  Three views of the right hand were performed.     FINDINGS:  No acute fractures or dislocation are seen. Joint spaces overall appear  maintained.     IMPRESSION:  No sign of acute right hand fracture or dislocation.     MACRO:  None.  Dictation workstation:   [id number]     Original Interpreting Physician:   NISHA SANTIAGO MD  Original Transcribed by/Date: MMODAL Sep 15 2023 10:59A  Original Electronically Signed by/Date: NISHA SANTIAGO MD Sep 15 2023 11:08A     Addendum Interpreting Physician:  Addendum Transcribed by/Date: NO ADDENDUM  Addendum Electronically Signed by/Date:     === 09/15/23 ===     XR HAND 3+ VIEWS RIGHT     - Impression -  No sign of acute right hand fracture or dislocation.     MACRO:  None.  Dictation workstation:   [id number]     Original Interpreting Physician:   NISHA SANTIAGO MD  Original Transcribed by/Date: MMODAL Sep 15 2023 10:59A  Original Electronically Signed by/Date: NISHA SANTIAGO MD Sep 15 2023 11:08A     Addendum Interpreting Physician:  Addendum Transcribed by/Date: NO ADDENDUM  Addendum Electronically Signed by/Date:   === 06/16/22 ===     MR LUMBAR SPINE WO CONTRAST     - Impression -  1. Multilevel degenerative discogenic changes within the lumbar spine severe  loss disc space height and endplate  No reactive changes L5/S1.  2. Asymmetric right paracentral disc osteophyte complex L5/S1 with mild  asymmetric narrowing right lateral recess abutting the emerging right S2  nerve root. No displacement.     This report has been produced using speech recognition.     Original Interpreting Physician:   MARTINEZ ASHTON MD  Original Transcribed by/Date: PSCB   Jun 16 2022  1:01P  Original Electronically Signed by/Date: MARTINEZ ASHTON MD Jun 16 2022  1:01P     Addendum Interpreting Physician:  Addendum Transcribed by/Date: NO ADDENDUM  Addendum Electronically Signed by/Date:        Assessment/Plan   previous diagnosis of RA.      Has diagnosis of enthesitis of R plantar fascia by podiatrist no improvement with CSI, has enthesitis of achilles, and b/l lateral epicondyles on previous exam and L GTB.   No synovitis of hands or feet.       Labs 3/3 reviewed:  ESR and CRP normal   RF and CCP neg  Hep neg  BRIONNA 1:80, chromatin 1   HLA B27 neg    Labs from 1/9/2024 reviewed:  CBC  and CMPnormal  ESR and CRP normal     XR 3/4 reviewed  XR hand: Mild degenerative changes  at the base of the thumb bilaterally  XR feet: Bilateral small plantar calcaneal enthesophytes.  SI: normal  Hips: normal   Elbows: normal    No improvement previously with LEF, methotrexate, orencia, meloxicam.     Will refer to [facility] US clinic for SpA evaluation and if enthesitis domain will trial Skyrizi vs TNFi.   - Counterforce braces for b/l lateral epicondylitis     RTC after [facility] US appt.         Zuleika Skelton MD  Division of Rheumatology   [facility]

## 2025-07-21 ENCOUNTER — APPOINTMENT (OUTPATIENT)
Dept: RHEUMATOLOGY | Facility: CLINIC | Age: 55
End: 2025-07-21
Payer: COMMERCIAL

## 2025-07-21 VITALS
BODY MASS INDEX: 34.55 KG/M2 | HEIGHT: 66 IN | HEART RATE: 78 BPM | WEIGHT: 215 LBS | DIASTOLIC BLOOD PRESSURE: 76 MMHG | SYSTOLIC BLOOD PRESSURE: 138 MMHG

## 2025-07-21 DIAGNOSIS — M77.9 ENTHESITIS: Primary | ICD-10-CM

## 2025-07-21 DIAGNOSIS — M67.20 SYNOVIAL HYPERTROPHY: ICD-10-CM

## 2025-07-21 PROCEDURE — 3008F BODY MASS INDEX DOCD: CPT | Performed by: INTERNAL MEDICINE

## 2025-07-21 PROCEDURE — 1036F TOBACCO NON-USER: CPT | Performed by: INTERNAL MEDICINE

## 2025-07-21 PROCEDURE — 76882 US LMTD JT/FCL EVL NVASC XTR: CPT | Performed by: INTERNAL MEDICINE

## 2025-07-21 ASSESSMENT — PATIENT HEALTH QUESTIONNAIRE - PHQ9
2. FEELING DOWN, DEPRESSED OR HOPELESS: NOT AT ALL
1. LITTLE INTEREST OR PLEASURE IN DOING THINGS: NOT AT ALL
SUM OF ALL RESPONSES TO PHQ9 QUESTIONS 1 & 2: 0

## 2025-07-21 ASSESSMENT — PAIN SCALES - GENERAL: PAINLEVEL_OUTOF10: 0-NO PAIN

## 2025-07-21 NOTE — PROGRESS NOTES
Patient ID: Margie Romero is a 54 y.o. female.      Performed by: Elio Zepeda MD  Authorized by: Elio Zepeda MD        Comments: See [facility] ultrasound documentation  Musculoskeletal Ultrasound    Date/Time: 7/21/2025 3:51 PM    Performed by: Elio Zepeda MD  Authorized by: Elio Zepeda MD    Comments:        Enthesitis protocol ( [facility] protocl)    Elbow - normal Triceps, common extensor tendon and flexor tendon attachment bilaterally   Normal quadriceps, patellar proximal and distal attachments bilaterally    Ankle - normal Achilless tendon attachment bilaterally  Right Plantar fascia with hypechogenicity and thickness around 5.1 mm  Left plantar fascia normal attachment with approx 4 mm thickness.    Patient was c/o pain in the MCPs  Quick view of the 2-3rd MCP shows minimal synovial hypertrophy and ostephyosis of bilateral 2nd MCPs with small effusion in the right 2nd MCP    Impression  No ultrasound evidence of enthesitis .  Mild synovial hypertrophy and minimal effusion of the 2nd MCP of right hand.

## 2025-08-29 ENCOUNTER — APPOINTMENT (OUTPATIENT)
Facility: CLINIC | Age: 55
End: 2025-08-29
Payer: COMMERCIAL

## 2025-09-26 ENCOUNTER — APPOINTMENT (OUTPATIENT)
Facility: CLINIC | Age: 55
End: 2025-09-26